# Patient Record
Sex: FEMALE
[De-identification: names, ages, dates, MRNs, and addresses within clinical notes are randomized per-mention and may not be internally consistent; named-entity substitution may affect disease eponyms.]

---

## 2018-03-28 ENCOUNTER — HOSPITAL ENCOUNTER (EMERGENCY)
Dept: HOSPITAL 42 - ED | Age: 38
Discharge: HOME | End: 2018-03-28
Payer: COMMERCIAL

## 2018-03-28 VITALS — BODY MASS INDEX: 20.5 KG/M2

## 2018-03-28 DIAGNOSIS — F17.210: ICD-10-CM

## 2018-03-28 DIAGNOSIS — J06.9: Primary | ICD-10-CM

## 2018-03-28 NOTE — ED PDOC
Arrival/HPI





- General


Chief Complaint: Cough, Cold, Congestion


Time Seen by Provider: 18 16:32


Historian: Patient





- History of Present Illness


Narrative History of Present Illness (Text): 





18 16:41


Pt is a 34 year old female who presents to the ED for a productive cough and HA 

for the past 4 days along with a subjective fever x 1 day. Reports green sputum 

especially at night. Reports sick contacts at home for URI. Denies chills, n/v/d

, back pain, or any other complaints














Time/Duration: < week


Symptom Onset: Sudden


Symptom Course: Unchanged


Quality: Aching, Pressure


Activities at Onset: Rest


Context: Home





Past Medical History





- Provider Review


Nursing Documentation Reviewed: Yes





- Travel History


Have you recently traveled outside [place] w/in the past 3 mons?: No





- Past History


Past History: Non-Contributing





- Infectious Disease


Hx of Infectious Diseases: None





- Tetanus Immunization


Tetanus Immunization: Unknown





- Cardiac


Hx Cardiac Disorders: No





- Pulmonary


Hx Respiratory Disorders: No





- Neurological


Hx Neurological Disorder: No





- HEENT


Hx HEENT Disorder: No





- Renal


Hx Renal Disorder: No





- Endocrine/Metabolic


Hx Endocrine Disorders: No





- Hematological/Oncological


Hx Blood Disorders: No





- Integumentary


Hx Dermatological Disorder: No





- Musculoskeletal/Rheumatological


Hx Musculoskeletal Disorders: No





- Gastrointestinal


Hx Gastrointestinal Disorders: No





- Genitourinary/Gynecological


Hx Genitourinary Disorders: No





- Psychiatric


Hx Psychophysiologic Disorder: Yes


Hx Depression: Yes


Hx Substance Use: Yes (HEROIN)


Other/Comment: history of substance abuse





- Surgical History


Hx Orthopedic Surgery: Yes (FEET)





- Suicidal Assessment


Feels Threatened In Home Enviroment: No





Family/Social History





- Physician Review


Nursing Documentation Reviewed: Yes


Family/Social History: Unknown Family HX


Smoking Status: Heavy Smoker > 10 Cigarettes Daily


Hx Alcohol Use: No


Hx Substance Use: Yes (HEROIN)


Hx Substance Use Treatment: No





Allergies/Home Meds


Allergies/Adverse Reactions: 


Allergies





No Known Allergies Allergy (Verified 18 16:12)


 








Home Medications: 


 Home Meds











 Medication  Instructions  Recorded  Confirmed


 


Citalopram Hydrobromide [Celexa] 40 mg PO DAILY 08/19/15 03/28/18


 


Lorazepam [Ativan] 1 mg PO DAILY 08/19/15 03/28/18


 


Quetiapine Fumarate [Seroquel] 200 mg PO HS 08/19/15 03/28/18


 


Prazosin HCl [Minipress] 1 mg PO DAILY 18


 


Sertraline [Zoloft] 5 mg PO DAILY 18














Review of Systems





- Review of Systems


Constitutional: Fatigue, Fevers


Eyes: Normal


ENT: Normal


Respiratory: Normal, Cough


Cardiovascular: Chest Pain


Gastrointestinal: Normal


Genitourinary Female: Normal


Musculoskeletal: Normal.  absent: Arthralgias, Back Pain, Neck Pain, Joint 

Swelling, Myalgias, Other


Skin: Normal


Neurological: Normal


Endocrine: Normal


Hemo/Lymphatic: Normal


Psychiatric: Normal





Physical Exam


Vital Signs Reviewed: Yes


Vital Signs











  Temp Pulse Resp BP Pulse Ox


 


 18 18:48   78  18  123/78  99


 


 18 16:16  98.8 F  88  16  109/76  97











Temperature: Afebrile


Blood Pressure: Normal


Pulse: Regular


Respiratory Rate: Normal


Appearance: Positive for: Well-Appearing, Non-Toxic, Comfortable


Pain Distress: None


Mental Status: Positive for: Alert and Oriented X 3





- Systems Exam


Head: Present: Atraumatic, Normocephalic


Pupils: Present: PERRL


Extroacular Muscles: Present: EOMI


Conjunctiva: Present: Normal


Ears: Present: Normal


Mouth: Present: Moist Mucous Membranes


Pharnyx: Present: Normal, ERYTHEMA (mild).  No: EXUDATE, TONSILS ENLARGED, 

Peritonsilar Swelling, Uvular Deviation, Muffled/Hoarse Voice, Strider, Soft 

Palate/Uvular Edema, Other


Neck: Present: Normal Range of Motion


Respiratory/Chest: Present: Clear to Auscultation, Good Air Exchange.  No: 

Respiratory Distress, Accessory Muscle Use, Wheezes, Decreased Breath Sounds, 

Rales, Retracting, Rhonchi, Tachypneic, Tender to Palpation, Other


Cardiovascular: Present: Regular Rate and Rhythm, Normal S1, S2.  No: Murmurs


Abdomen: Present: Normal Bowel Sounds.  No: Tenderness, Distention, Peritoneal 

Signs, Rebound, Guarding, McBurney's Point Tender, Rovsing's Sign Present, 

Hernias, Feeding Tubes, Ostomy Tubes, Mass/Organomegaly, Scars, Other


Back: Present: Normal Inspection


Upper Extremity: Present: Normal Inspection.  No: Cyanosis, Edema


Lower Extremity: Present: Normal Inspection.  No: Edema


Neurological: Present: GCS=15, CN II-XII Intact, Speech Normal


Skin: Present: Warm, Dry, Normal Color.  No: Rashes


Lymphatic: No: Cervical Adenopathy, Axillary Adenopathy, Inguinal Adenopathy, 

Other


Psychiatric: Present: Alert, Oriented x 3, Normal Insight, Normal Concentration





Medical Decision Making


ED Course and Treatment: 





18 18:54


Pt is a 34 year old female who presents to the ED for a productive cough and HA 

for the past 4 days along with a subjective fever x 1 day.





Plan


CXR


assess and dispo





Progress note





HISTORY:


chest congestion  





COMPARISON:


Chest radiographs 2015.





TECHNIQUE:


Chest PA and lateral





FINDINGS:





LUNGS:


No active pulmonary disease.





PLEURA:


No significant pleural effusion identified. No pneumothorax apparent.





CARDIOVASCULAR:


Normal.





OSSEOUS STRUCTURES:


Reversed S-shaped scoliosis of the thoracolumbar spine again appreciated.





VISUALIZED UPPER ABDOMEN:


Normal.





OTHER FINDINGS:


Metallic nipple rings again identified.





IMPRESSION:


No interval acute cardiopulmonary disease appreciated.





Discussed findings with pt and advised Z-jarrod for purulent sputum x4 days


F/u with PMD in next few days


VSS











- RAD Interpretation


Radiology Orders: 








18 16:47


CXR [CHEST TWO VIEWS (PA/LAT)] [RAD] Stat 














Disposition/Present on Arrival





- Present on Arrival


Any Indicators Present on Arrival: Yes


History of DVT/PE: No


History of Uncontrolled Diabetes: No


Urinary Catheter: No


History of Decub. Ulcer: No


History Surgical Site Infection Following: None





- Disposition


Have Diagnosis and Disposition been Completed?: Yes


Diagnosis: 


 URI (upper respiratory infection)





Disposition: HOME/ ROUTINE


Disposition Time: 17:44


Patient Plan: Discharge


Condition: STABLE


Discharge Instructions (ExitCare):  Bacterial Upper Respiratory Infection, Adult


Additional Instructions: 


Dear Olya,


Your were treated in the ED today for a cough and congestion. You otherwise 

were sitting up, comfortable, without any neck pain. and your chest xray was 

negative for pneumonia, thus we discharged you home. We recommend the followin. Over the counter Tylenol or Motrin as directed for pain and fever control 

and Tessalon Perles for cough and Azithromycin antibiotics for 5 days.  2. 

humidifier daily for breathing relief. 3. drink lots of fluids, and make sure 

your urinating properly. 4. followup your primary care physician in 1-2 days to 

review your symptoms. 5. if any worsening pain, fever, chills, nausea, vomiting

, difficulty breathing, numbness, tingling, unable to eat/drink or any medical 

condition then return to the ED.





All the best in your recovery


STEPHAN Su


Prescriptions: 


Azithromycin 250 mg PO DAILY 5 Days #6 tablet


Benzonatate [Tessalon Perle] 100 mg PO Q8 5 Days #15 capsule


Referrals: 


PCP,NO [Primary Care Provider] - Follow up with primary


Forms:  [facility] (English), WORK NOTE

## 2018-03-28 NOTE — RAD
HISTORY:

chest congestion  



COMPARISON:

Chest radiographs 12/01/2015.



TECHNIQUE:

Chest PA and lateral



FINDINGS:



LUNGS:

No active pulmonary disease.



PLEURA:

No significant pleural effusion identified. No pneumothorax apparent.



CARDIOVASCULAR:

Normal.



OSSEOUS STRUCTURES:

Reversed S-shaped scoliosis of the thoracolumbar spine again 

appreciated.



VISUALIZED UPPER ABDOMEN:

Normal.



OTHER FINDINGS:

Metallic nipple rings again identified.



IMPRESSION:

No interval acute cardiopulmonary disease appreciated.

## 2018-03-29 VITALS
SYSTOLIC BLOOD PRESSURE: 123 MMHG | TEMPERATURE: 98.8 F | RESPIRATION RATE: 18 BRPM | OXYGEN SATURATION: 99 % | DIASTOLIC BLOOD PRESSURE: 78 MMHG | HEART RATE: 78 BPM